# Patient Record
Sex: FEMALE | Race: WHITE | NOT HISPANIC OR LATINO
[De-identification: names, ages, dates, MRNs, and addresses within clinical notes are randomized per-mention and may not be internally consistent; named-entity substitution may affect disease eponyms.]

---

## 2023-07-17 ENCOUNTER — NON-APPOINTMENT (OUTPATIENT)
Age: 53
End: 2023-07-17

## 2023-07-17 ENCOUNTER — APPOINTMENT (OUTPATIENT)
Dept: SURGERY | Facility: CLINIC | Age: 53
End: 2023-07-17
Payer: COMMERCIAL

## 2023-07-17 VITALS
DIASTOLIC BLOOD PRESSURE: 79 MMHG | WEIGHT: 139 LBS | BODY MASS INDEX: 23.73 KG/M2 | HEART RATE: 70 BPM | OXYGEN SATURATION: 100 % | SYSTOLIC BLOOD PRESSURE: 118 MMHG | HEIGHT: 64 IN

## 2023-07-17 DIAGNOSIS — Z83.3 FAMILY HISTORY OF DIABETES MELLITUS: ICD-10-CM

## 2023-07-17 DIAGNOSIS — S39.011A STRAIN OF MUSCLE, FASCIA AND TENDON OF ABDOMEN, INITIAL ENCOUNTER: ICD-10-CM

## 2023-07-17 DIAGNOSIS — Z87.19 PERSONAL HISTORY OF OTHER DISEASES OF THE DIGESTIVE SYSTEM: ICD-10-CM

## 2023-07-17 DIAGNOSIS — Z87.42 PERSONAL HISTORY OF OTHER DISEASES OF THE FEMALE GENITAL TRACT: ICD-10-CM

## 2023-07-17 PROCEDURE — 99203 OFFICE O/P NEW LOW 30 MIN: CPT

## 2023-07-17 NOTE — PHYSICAL EXAM
[Normal Breath Sounds] : Normal breath sounds [Normal Heart Sounds] : normal heart sounds [Normal Rate and Rhythm] : normal rate and rhythm [Alert] : alert [Oriented to Person] : oriented to person [Oriented to Place] : oriented to place [Oriented to Time] : oriented to time [Calm] : calm [de-identified] : well developed white female in no acute distress [de-identified] : Normal Bowel Sounds, non-distended, mild pain with deep palpation right lower abdomen, no palpable hernia [de-identified] : No CVA T, no le swelling

## 2023-07-17 NOTE — PLAN
[FreeTextEntry1] : -Avoid heavy lifting, abdominal or core exercises.\par -Recommend warm compresses to AA, TID.\par -Recommend NSAID's (Advil or Motrin) with food prn pain.\par -Follow up in 4 weeks.

## 2023-07-17 NOTE — HISTORY OF PRESENT ILLNESS
[de-identified] : Right lower abdominal pain [de-identified] : This 53 yo WF with 6 mo h/o RLQ pain.  Patient denies h/o trauma.  Pain is chronic, without radiation.  Patient denies any aggravating or alleviating symptoms.  Patient denies feeling a lump, fever, chills, nausea, vomiting or change in bowel habits.  Patient was evaluated by PCP in Jan, CT scan without contrast showed nothing significant per Pt.\par Patient evaluated by OB/GYN, Pelvic US ok per Pt.  Patient with h/o Diverticulitis 2003, no recent flairs. Pt eval by Dr. Clay (GI) within past two months.  \par Colonoscopy 06/15/2023: Internal Hemorrhoids, Diverticulosis, No colitis.  \par Rpt CT Ab/Pelvis with contrast 06/22/2023: Liver with fatty infiltration, 0.6 cm hypodense focus left anterior lobe, too small to characterize, most likely benign.

## 2023-08-28 ENCOUNTER — APPOINTMENT (OUTPATIENT)
Dept: SURGERY | Facility: CLINIC | Age: 53
End: 2023-08-28
Payer: COMMERCIAL

## 2023-08-28 PROCEDURE — 99212 OFFICE O/P EST SF 10 MIN: CPT

## 2023-08-28 RX ORDER — MELOXICAM 15 MG/1
15 TABLET ORAL DAILY
Qty: 30 | Refills: 0 | Status: ACTIVE | COMMUNITY
Start: 2023-08-28 | End: 1900-01-01

## 2023-08-28 NOTE — HISTORY OF PRESENT ILLNESS
[de-identified] : Right lower abdominal pain [de-identified] : Patient still with chronic Right lower abdominal pain, 4/10 in severity.  Patient continues to deny fever, chills, nausea, vomiting or change in bowel habits.   Patient states that Advil and warm compressed didn't help with pain.  Patient denies pain or stiffness in right hip, or pain when ambulating on stairs or getting in/out of a car.  Interval 07/17/2023: This 51 yo WF with 6 mo h/o RLQ pain.  Patient denies h/o trauma.  Pain is chronic, without radiation.  Patient denies any aggravating or alleviating symptoms.  Patient denies feeling a lump, fever, chills, nausea, vomiting or change in bowel habits.  Patient was evaluated by PCP in Jan, CT scan without contrast showed nothing significant per Pt. Patient evaluated by OB/GYN, Pelvic US ok per Pt.  Patient with h/o Diverticulitis 2003, no recent flairs. Pt eval by Dr. Clay (GI) within past two months.   Colonoscopy 06/15/2023: Internal Hemorrhoids, Diverticulosis, No colitis.   Rpt CT Ab/Pelvis with contrast 06/22/2023: Liver with fatty infiltration, 0.6 cm hypodense focus left anterior lobe, too small to characterize, most likely benign.

## 2023-08-28 NOTE — PHYSICAL EXAM
[Normal Breath Sounds] : Normal breath sounds [No Rash or Lesion] : No rash or lesion [Alert] : alert [Oriented to Person] : oriented to person [Oriented to Place] : oriented to place [Oriented to Time] : oriented to time [Calm] : calm [de-identified] : well-developed white female in NAD [de-identified] : Normal BS, soft, point tenderness right lower abdomen/groin, no hernia palpated [de-identified] : No dec rom Right hip, No le swelling

## 2023-08-28 NOTE — PLAN
[FreeTextEntry1] : -Recommend Meloxicam and warm compresses to area. -Follow up in 1 month, or sooner if needed. -Consider repeat CT scan Ab/Pelvis with marker to painful site in the future (h/o CT scans 4/2023, 6/2023)

## 2023-10-23 ENCOUNTER — APPOINTMENT (OUTPATIENT)
Dept: SURGERY | Facility: CLINIC | Age: 53
End: 2023-10-23
Payer: COMMERCIAL

## 2023-10-23 PROCEDURE — 99212 OFFICE O/P EST SF 10 MIN: CPT

## 2023-11-14 ENCOUNTER — APPOINTMENT (OUTPATIENT)
Dept: SURGERY | Facility: CLINIC | Age: 53
End: 2023-11-14
Payer: COMMERCIAL

## 2023-11-14 PROCEDURE — 99212 OFFICE O/P EST SF 10 MIN: CPT

## 2024-01-16 ENCOUNTER — APPOINTMENT (OUTPATIENT)
Dept: SURGERY | Facility: CLINIC | Age: 54
End: 2024-01-16
Payer: COMMERCIAL

## 2024-01-16 DIAGNOSIS — S39.011A STRAIN OF MUSCLE, FASCIA AND TENDON OF ABDOMEN, INITIAL ENCOUNTER: ICD-10-CM

## 2024-01-16 PROCEDURE — 99024 POSTOP FOLLOW-UP VISIT: CPT

## 2024-01-16 NOTE — PHYSICAL EXAM
[Normal Breath Sounds] : Normal breath sounds [Alert] : alert [Oriented to Person] : oriented to person [Oriented to Place] : oriented to place [Oriented to Time] : oriented to time [Calm] : calm [de-identified] : well-developed white female in NAD [de-identified] : non icteric [de-identified] : Normal BS, non-distended, no rigidity or rebound, soft, mild tenderness to palpation RLQ [de-identified] : calves soft, non-tender

## 2024-01-16 NOTE — HISTORY OF PRESENT ILLNESS
[de-identified] : Right lower abdominal pain [de-identified] : Patient returns still with chronic RLQ pain without alleviating or aggravation factors.  Pain occasionally radiates to right flank.  Patient tolerating regular diet, with normal bowel function.  Last visit 11/14/2023: Patient still with RLQ pain.  Patient denies any aggravating factors.  Patient admits to minimal improvement with NSAID's and warm compresses.  Patient denies fever, chills, nausea, vomiting or change in bowel habits.  F/U CT Scan Ab/Pelvis 10/27/2023: Stable sub centimeter hypodense liver lesion, small fat-containing umbilical hernia. No change from prior study. CT Ab/Pelvis 06/22/2023: Liver with fatty infiltration, 0.6 cm hypodense focus left anterior lobe, too small to characterize, most likely benign.  Interval Hx 10/23/2023: Patient still with RLQ pain, somewhat improved on course of Mobic.  Patient denies fever, chills, nausea, vomiting or change in bowel habits.  She denies feeling a lump in groin.  Patient is able to mobilize well, without Right hip stiffness.  Interval Hx 08/28/2023: Patient still with chronic Right lower abdominal pain, 4/10 in severity.  Patient continues to deny fever, chills, nausea, vomiting or change in bowel habits.   Patient states that Advil and warm compressed didn't help with pain.  Patient denies pain or stiffness in right hip, or pain when ambulating on stairs or getting in/out of a car.  Interval 07/17/2023: This 53 yo WF with 6 mo h/o RLQ pain.  Patient denies h/o trauma.  Pain is chronic, without radiation.  Patient denies any aggravating or alleviating symptoms.  Patient denies feeling a lump, fever, chills, nausea, vomiting or change in bowel habits.  Patient was evaluated by PCP in Jan, CT scan without contrast showed nothing significant per Pt. Patient evaluated by OB/GYN, Pelvic US ok per Pt.  Patient with h/o Diverticulitis 2003, no recent flairs. Pt eval by Dr. Clay (GI) within past two months.    Studies: Colonoscopy 06/15/2023: Internal Hemorrhoids, Diverticulosis, No colitis.   Rpt CT Ab/Pelvis with contrast 06/22/2023: Liver with fatty infiltration, 0.6 cm hypodense focus left anterior lobe, too small to characterize, most likely benign.

## 2024-01-16 NOTE — PLAN
[FreeTextEntry1] : -Follow up after MRI. -Also recommend Orthopedic evaluation of Spine as possible cause of pain.  Copy of CT scans given to patient. [facility] Phone number given to patient.

## 2024-03-05 ENCOUNTER — APPOINTMENT (OUTPATIENT)
Dept: SURGERY | Facility: CLINIC | Age: 54
End: 2024-03-05
Payer: COMMERCIAL

## 2024-03-05 DIAGNOSIS — G89.29 RIGHT LOWER QUADRANT PAIN: ICD-10-CM

## 2024-03-05 DIAGNOSIS — R10.31 RIGHT LOWER QUADRANT PAIN: ICD-10-CM

## 2024-03-05 PROCEDURE — 99212 OFFICE O/P EST SF 10 MIN: CPT

## 2024-03-05 NOTE — HISTORY OF PRESENT ILLNESS
[de-identified] : Persistent RLQ pain [de-identified] : Pt reports that her pain has not changed. She still has not noticed any aggravating or alleviating factors. No associated symptoms. Pt also just had an MRI that had no significant findings.

## 2024-03-05 NOTE — ASSESSMENT
[FreeTextEntry1] : I have discussed with pt that after extensive testing I have been unable to identify the cause of her pain. My only recommendation would be to see ortho and if that is nonconclusive to see pain management.

## 2024-03-05 NOTE — PHYSICAL EXAM
[Normal Breath Sounds] : Normal breath sounds [Normal Heart Sounds] : normal heart sounds [Normal Rate and Rhythm] : normal rate and rhythm [Calm] : calm [de-identified] : normal bowel sounds, without distension, with RLQ tenderness without guarding or rebound tenderness. [de-identified] : well developed white female in no acute distress [de-identified] : without palpable hernia